# Patient Record
Sex: FEMALE | Race: BLACK OR AFRICAN AMERICAN | NOT HISPANIC OR LATINO | Employment: OTHER | URBAN - METROPOLITAN AREA
[De-identification: names, ages, dates, MRNs, and addresses within clinical notes are randomized per-mention and may not be internally consistent; named-entity substitution may affect disease eponyms.]

---

## 2022-07-01 ENCOUNTER — HOSPITAL ENCOUNTER (OUTPATIENT)
Facility: HOSPITAL | Age: 72
Discharge: HOME | End: 2022-07-02
Attending: EMERGENCY MEDICINE | Admitting: HOSPITALIST
Payer: MEDICARE

## 2022-07-01 DIAGNOSIS — K30 INDIGESTION: ICD-10-CM

## 2022-07-01 DIAGNOSIS — I50.30 (HFPEF) HEART FAILURE WITH PRESERVED EJECTION FRACTION: ICD-10-CM

## 2022-07-01 DIAGNOSIS — R79.89 ELEVATED TROPONIN: Primary | ICD-10-CM

## 2022-07-01 DIAGNOSIS — I21.4 NSTEMI (NON-ST ELEVATED MYOCARDIAL INFARCTION): ICD-10-CM

## 2022-07-01 DIAGNOSIS — H57.9 ITCHY EYES: ICD-10-CM

## 2022-07-01 DIAGNOSIS — R42 DIZZINESS: ICD-10-CM

## 2022-07-01 DIAGNOSIS — R42 DIZZY: ICD-10-CM

## 2022-07-01 PROBLEM — I10 PRIMARY HYPERTENSION: Status: ACTIVE | Noted: 2022-07-01

## 2022-07-01 PROBLEM — E66.9 CLASS 2 OBESITY IN ADULT: Status: ACTIVE | Noted: 2022-07-01

## 2022-07-01 LAB
ALBUMIN SERPL-MCNC: 3.6 G/DL (ref 3.3–5.5)
ALP SERPL-CCNC: 81 U/L (ref 42–141)
ASCENDING AORTA: 2.92 CM
AV INDEX (PROSTH): 0.7
AV MEAN GRADIENT: 6 MMHG
AV PEAK GRADIENT: 11 MMHG
AV VALVE AREA: 2.21 CM2
AV VELOCITY RATIO: 0.65
BILIRUB SERPL-MCNC: 0.7 MG/DL (ref 0.2–1.6)
BILIRUBIN, POC UA: NEGATIVE
BLOOD, POC UA: ABNORMAL
BSA FOR ECHO PROCEDURE: 2.3 M2
BUN SERPL-MCNC: 14 MG/DL (ref 7–22)
CALCIUM SERPL-MCNC: 9.4 MG/DL (ref 8–10.3)
CHLORIDE SERPL-SCNC: 107 MMOL/L (ref 98–108)
CHOLEST SERPL-MCNC: 212 MG/DL (ref 120–199)
CHOLEST/HDLC SERPL: 4.4 {RATIO} (ref 2–5)
CLARITY, POC UA: CLEAR
COLOR, POC UA: YELLOW
CREAT SERPL-MCNC: 0.9 MG/DL (ref 0.6–1.2)
CTP QC/QA: YES
CV ECHO LV RWT: 0.43 CM
DOP CALC AO PEAK VEL: 1.66 M/S
DOP CALC AO VTI: 37.71 CM
DOP CALC LVOT AREA: 3.2 CM2
DOP CALC LVOT DIAMETER: 2.01 CM
DOP CALC LVOT PEAK VEL: 1.08 M/S
DOP CALC LVOT STROKE VOLUME: 83.41 CM3
DOP CALCLVOT PEAK VEL VTI: 26.3 CM
E WAVE DECELERATION TIME: 231.41 MSEC
E/A RATIO: 0.85
E/E' RATIO: 10 M/S
ECHO LV POSTERIOR WALL: 0.92 CM (ref 0.6–1.1)
EJECTION FRACTION: 65 %
FRACTIONAL SHORTENING: 38 % (ref 28–44)
GLUCOSE SERPL-MCNC: 132 MG/DL (ref 73–118)
GLUCOSE, POC UA: NEGATIVE
HDLC SERPL-MCNC: 48 MG/DL (ref 40–75)
HDLC SERPL: 22.6 % (ref 20–50)
INTERVENTRICULAR SEPTUM: 0.92 CM (ref 0.6–1.1)
IVRT: 110.73 MSEC
KETONES, POC UA: NEGATIVE
LA MAJOR: 5.37 CM
LA MINOR: 5.43 CM
LA WIDTH: 4.76 CM
LDLC SERPL CALC-MCNC: 149 MG/DL (ref 63–159)
LEFT ATRIUM SIZE: 3.79 CM
LEFT ATRIUM VOLUME INDEX: 37.5 ML/M2
LEFT ATRIUM VOLUME: 82.8 CM3
LEFT INTERNAL DIMENSION IN SYSTOLE: 2.65 CM (ref 2.1–4)
LEFT VENTRICLE DIASTOLIC VOLUME INDEX: 37.32 ML/M2
LEFT VENTRICLE DIASTOLIC VOLUME: 82.48 ML
LEFT VENTRICLE MASS INDEX: 57 G/M2
LEFT VENTRICLE SYSTOLIC VOLUME INDEX: 11.7 ML/M2
LEFT VENTRICLE SYSTOLIC VOLUME: 25.91 ML
LEFT VENTRICULAR INTERNAL DIMENSION IN DIASTOLE: 4.29 CM (ref 3.5–6)
LEFT VENTRICULAR MASS: 126.56 G
LEUKOCYTE EST, POC UA: ABNORMAL
LV LATERAL E/E' RATIO: 8.89 M/S
LV SEPTAL E/E' RATIO: 11.43 M/S
MV PEAK A VEL: 0.94 M/S
MV PEAK E VEL: 0.8 M/S
MV STENOSIS PRESSURE HALF TIME: 67.11 MS
MV VALVE AREA P 1/2 METHOD: 3.28 CM2
NITRITE, POC UA: NEGATIVE
NONHDLC SERPL-MCNC: 164 MG/DL
PH UR STRIP: 5.5 [PH]
PISA TR MAX VEL: 2.96 M/S
POC ALT (SGPT): 19 U/L (ref 10–47)
POC AST (SGOT): 24 U/L (ref 11–38)
POC B-TYPE NATRIURETIC PEPTIDE: 31.4 PG/ML (ref 0–100)
POC CARDIAC TROPONIN I: 0.01 NG/ML
POC CARDIAC TROPONIN I: 0.1 NG/ML
POC TCO2: 28 MMOL/L (ref 18–33)
POTASSIUM BLD-SCNC: 4.2 MMOL/L (ref 3.6–5.1)
PROTEIN, POC UA: NEGATIVE
PROTEIN, POC: 7.9 G/DL (ref 6.4–8.1)
PULM VEIN S/D RATIO: 2.03
PV PEAK D VEL: 0.36 M/S
PV PEAK S VEL: 0.73 M/S
PV PEAK VELOCITY: 1.24 CM/S
RA MAJOR: 5.32 CM
RA PRESSURE: 3 MMHG
RA WIDTH: 3.92 CM
RIGHT VENTRICULAR END-DIASTOLIC DIMENSION: 3.36 CM
RV TISSUE DOPPLER FREE WALL SYSTOLIC VELOCITY 1 (APICAL 4 CHAMBER VIEW): 13.51 CM/S
SAMPLE: ABNORMAL
SAMPLE: NORMAL
SARS-COV-2 RDRP RESP QL NAA+PROBE: NEGATIVE
SINUS: 2.72 CM
SODIUM BLD-SCNC: 146 MMOL/L (ref 128–145)
SPECIFIC GRAVITY, POC UA: 1.01
STJ: 2.17 CM
TDI LATERAL: 0.09 M/S
TDI SEPTAL: 0.07 M/S
TDI: 0.08 M/S
TR MAX PG: 35 MMHG
TRICUSPID ANNULAR PLANE SYSTOLIC EXCURSION: 2.69 CM
TRIGL SERPL-MCNC: 75 MG/DL (ref 30–150)
TROPONIN I SERPL DL<=0.01 NG/ML-MCNC: 0.01 NG/ML (ref 0–0.03)
TROPONIN I SERPL DL<=0.01 NG/ML-MCNC: 0.01 NG/ML (ref 0–0.03)
TV REST PULMONARY ARTERY PRESSURE: 38 MMHG
UROBILINOGEN, POC UA: 0.2 E.U./DL

## 2022-07-01 PROCEDURE — 84484 ASSAY OF TROPONIN QUANT: CPT | Mod: 91,ER

## 2022-07-01 PROCEDURE — 25000003 PHARM REV CODE 250: Performed by: HOSPITALIST

## 2022-07-01 PROCEDURE — 84484 ASSAY OF TROPONIN QUANT: CPT | Mod: ER

## 2022-07-01 PROCEDURE — 99223 1ST HOSP IP/OBS HIGH 75: CPT | Mod: 25,,, | Performed by: INTERNAL MEDICINE

## 2022-07-01 PROCEDURE — 85025 COMPLETE CBC W/AUTO DIFF WBC: CPT | Mod: ER

## 2022-07-01 PROCEDURE — U0002 COVID-19 LAB TEST NON-CDC: HCPCS | Mod: ER | Performed by: NURSE PRACTITIONER

## 2022-07-01 PROCEDURE — 84484 ASSAY OF TROPONIN QUANT: CPT | Mod: 91 | Performed by: HOSPITALIST

## 2022-07-01 PROCEDURE — 80061 LIPID PANEL: CPT | Performed by: HOSPITALIST

## 2022-07-01 PROCEDURE — 96375 TX/PRO/DX INJ NEW DRUG ADDON: CPT | Mod: ER

## 2022-07-01 PROCEDURE — 93010 EKG 12-LEAD: ICD-10-PCS | Mod: ,,, | Performed by: INTERNAL MEDICINE

## 2022-07-01 PROCEDURE — 96372 THER/PROPH/DIAG INJ SC/IM: CPT | Performed by: EMERGENCY MEDICINE

## 2022-07-01 PROCEDURE — 93010 ELECTROCARDIOGRAM REPORT: CPT | Mod: ,,, | Performed by: INTERNAL MEDICINE

## 2022-07-01 PROCEDURE — G0378 HOSPITAL OBSERVATION PER HR: HCPCS

## 2022-07-01 PROCEDURE — 25000003 PHARM REV CODE 250: Mod: ER | Performed by: EMERGENCY MEDICINE

## 2022-07-01 PROCEDURE — 11000001 HC ACUTE MED/SURG PRIVATE ROOM

## 2022-07-01 PROCEDURE — 63600175 PHARM REV CODE 636 W HCPCS: Mod: ER | Performed by: EMERGENCY MEDICINE

## 2022-07-01 PROCEDURE — 81003 URINALYSIS AUTO W/O SCOPE: CPT | Mod: ER

## 2022-07-01 PROCEDURE — 80053 COMPREHEN METABOLIC PANEL: CPT | Mod: ER

## 2022-07-01 PROCEDURE — 99285 EMERGENCY DEPT VISIT HI MDM: CPT | Mod: 25,ER

## 2022-07-01 PROCEDURE — 96374 THER/PROPH/DIAG INJ IV PUSH: CPT | Mod: ER

## 2022-07-01 PROCEDURE — 36415 COLL VENOUS BLD VENIPUNCTURE: CPT | Performed by: HOSPITALIST

## 2022-07-01 PROCEDURE — 99223 PR INITIAL HOSPITAL CARE,LEVL III: ICD-10-PCS | Mod: 25,,, | Performed by: INTERNAL MEDICINE

## 2022-07-01 PROCEDURE — 83880 ASSAY OF NATRIURETIC PEPTIDE: CPT | Mod: ER

## 2022-07-01 PROCEDURE — 93005 ELECTROCARDIOGRAM TRACING: CPT | Mod: ER

## 2022-07-01 RX ORDER — CALCIUM CARBONATE 200(500)MG
500 TABLET,CHEWABLE ORAL 3 TIMES DAILY PRN
Status: DISCONTINUED | OUTPATIENT
Start: 2022-07-01 | End: 2022-07-02 | Stop reason: HOSPADM

## 2022-07-01 RX ORDER — LIDOCAINE HYDROCHLORIDE 20 MG/ML
10 SOLUTION OROPHARYNGEAL
Status: COMPLETED | OUTPATIENT
Start: 2022-07-01 | End: 2022-07-01

## 2022-07-01 RX ORDER — KETOROLAC TROMETHAMINE 30 MG/ML
15 INJECTION, SOLUTION INTRAMUSCULAR; INTRAVENOUS
Status: COMPLETED | OUTPATIENT
Start: 2022-07-01 | End: 2022-07-01

## 2022-07-01 RX ORDER — ASPIRIN 81 MG/1
81 TABLET ORAL DAILY
Status: DISCONTINUED | OUTPATIENT
Start: 2022-07-02 | End: 2022-07-02 | Stop reason: HOSPADM

## 2022-07-01 RX ORDER — PROCHLORPERAZINE EDISYLATE 5 MG/ML
10 INJECTION INTRAMUSCULAR; INTRAVENOUS ONCE
Status: COMPLETED | OUTPATIENT
Start: 2022-07-01 | End: 2022-07-01

## 2022-07-01 RX ORDER — PROCHLORPERAZINE MALEATE 5 MG
10 TABLET ORAL
Status: DISCONTINUED | OUTPATIENT
Start: 2022-07-01 | End: 2022-07-01

## 2022-07-01 RX ORDER — PANTOPRAZOLE SODIUM 40 MG/1
40 TABLET, DELAYED RELEASE ORAL DAILY
Status: DISCONTINUED | OUTPATIENT
Start: 2022-07-01 | End: 2022-07-02 | Stop reason: HOSPADM

## 2022-07-01 RX ORDER — ONDANSETRON 2 MG/ML
4 INJECTION INTRAMUSCULAR; INTRAVENOUS EVERY 4 HOURS PRN
Status: DISCONTINUED | OUTPATIENT
Start: 2022-07-01 | End: 2022-07-02 | Stop reason: HOSPADM

## 2022-07-01 RX ORDER — ASPIRIN 325 MG
325 TABLET, DELAYED RELEASE (ENTERIC COATED) ORAL
Status: COMPLETED | OUTPATIENT
Start: 2022-07-01 | End: 2022-07-01

## 2022-07-01 RX ORDER — LOSARTAN POTASSIUM 25 MG/1
50 TABLET ORAL DAILY
Status: DISCONTINUED | OUTPATIENT
Start: 2022-07-01 | End: 2022-07-02

## 2022-07-01 RX ORDER — MAG HYDROX/ALUMINUM HYD/SIMETH 200-200-20
30 SUSPENSION, ORAL (FINAL DOSE FORM) ORAL
Status: COMPLETED | OUTPATIENT
Start: 2022-07-01 | End: 2022-07-01

## 2022-07-01 RX ORDER — PROCHLORPERAZINE EDISYLATE 5 MG/ML
10 INJECTION INTRAMUSCULAR; INTRAVENOUS ONCE
Status: DISCONTINUED | OUTPATIENT
Start: 2022-07-01 | End: 2022-07-01

## 2022-07-01 RX ORDER — ATORVASTATIN CALCIUM 40 MG/1
40 TABLET, FILM COATED ORAL DAILY
Status: DISCONTINUED | OUTPATIENT
Start: 2022-07-01 | End: 2022-07-02 | Stop reason: HOSPADM

## 2022-07-01 RX ORDER — MAG HYDROX/ALUMINUM HYD/SIMETH 200-200-20
30 SUSPENSION, ORAL (FINAL DOSE FORM) ORAL ONCE
Status: COMPLETED | OUTPATIENT
Start: 2022-07-01 | End: 2022-07-01

## 2022-07-01 RX ORDER — DICYCLOMINE HYDROCHLORIDE 10 MG/1
10 CAPSULE ORAL
Status: DISPENSED | OUTPATIENT
Start: 2022-07-01 | End: 2022-07-01

## 2022-07-01 RX ORDER — METOPROLOL TARTRATE 50 MG/1
50 TABLET ORAL 2 TIMES DAILY
Status: DISCONTINUED | OUTPATIENT
Start: 2022-07-01 | End: 2022-07-02 | Stop reason: HOSPADM

## 2022-07-01 RX ORDER — CLONIDINE HYDROCHLORIDE 0.1 MG/1
0.1 TABLET ORAL EVERY 4 HOURS PRN
Status: DISCONTINUED | OUTPATIENT
Start: 2022-07-01 | End: 2022-07-02 | Stop reason: HOSPADM

## 2022-07-01 RX ORDER — IBUPROFEN 600 MG/1
600 TABLET ORAL
Status: DISCONTINUED | OUTPATIENT
Start: 2022-07-01 | End: 2022-07-01

## 2022-07-01 RX ORDER — LIDOCAINE HYDROCHLORIDE 20 MG/ML
10 SOLUTION OROPHARYNGEAL ONCE
Status: COMPLETED | OUTPATIENT
Start: 2022-07-01 | End: 2022-07-01

## 2022-07-01 RX ORDER — ENOXAPARIN SODIUM 100 MG/ML
1 INJECTION SUBCUTANEOUS
Status: COMPLETED | OUTPATIENT
Start: 2022-07-01 | End: 2022-07-01

## 2022-07-01 RX ORDER — CLOPIDOGREL BISULFATE 75 MG/1
75 TABLET ORAL DAILY
Status: DISCONTINUED | OUTPATIENT
Start: 2022-07-02 | End: 2022-07-02 | Stop reason: HOSPADM

## 2022-07-01 RX ORDER — CLOPIDOGREL 300 MG/1
300 TABLET, FILM COATED ORAL ONCE
Status: COMPLETED | OUTPATIENT
Start: 2022-07-01 | End: 2022-07-01

## 2022-07-01 RX ORDER — CLOPIDOGREL 300 MG/1
300 TABLET, FILM COATED ORAL ONCE
Status: DISCONTINUED | OUTPATIENT
Start: 2022-07-01 | End: 2022-07-01

## 2022-07-01 RX ADMIN — LIDOCAINE HYDROCHLORIDE 10 ML: 20 SOLUTION ORAL at 04:07

## 2022-07-01 RX ADMIN — ALUMINUM HYDROXIDE, MAGNESIUM HYDROXIDE, AND SIMETHICONE 30 ML: 200; 200; 20 SUSPENSION ORAL at 07:07

## 2022-07-01 RX ADMIN — ASPIRIN 325 MG: 325 TABLET, COATED ORAL at 10:07

## 2022-07-01 RX ADMIN — ALUMINUM HYDROXIDE, MAGNESIUM HYDROXIDE, AND SIMETHICONE 30 ML: 200; 200; 20 SUSPENSION ORAL at 04:07

## 2022-07-01 RX ADMIN — PROCHLORPERAZINE EDISYLATE 10 MG: 5 INJECTION INTRAMUSCULAR; INTRAVENOUS at 08:07

## 2022-07-01 RX ADMIN — METOPROLOL TARTRATE 50 MG: 50 TABLET, FILM COATED ORAL at 09:07

## 2022-07-01 RX ADMIN — ATORVASTATIN CALCIUM 40 MG: 40 TABLET, FILM COATED ORAL at 02:07

## 2022-07-01 RX ADMIN — CLONIDINE HYDROCHLORIDE 0.1 MG: 0.1 TABLET ORAL at 06:07

## 2022-07-01 RX ADMIN — LIDOCAINE HYDROCHLORIDE 10 ML: 20 SOLUTION ORAL at 07:07

## 2022-07-01 RX ADMIN — PANTOPRAZOLE SODIUM 40 MG: 40 TABLET, DELAYED RELEASE ORAL at 03:07

## 2022-07-01 RX ADMIN — KETOROLAC TROMETHAMINE 15 MG: 30 INJECTION, SOLUTION INTRAMUSCULAR at 08:07

## 2022-07-01 RX ADMIN — CLOPIDOGREL BISULFATE 300 MG: 300 TABLET, FILM COATED ORAL at 10:07

## 2022-07-01 RX ADMIN — ENOXAPARIN SODIUM 110 MG: 60 INJECTION SUBCUTANEOUS at 10:07

## 2022-07-01 RX ADMIN — CALCIUM CARBONATE (ANTACID) CHEW TAB 500 MG 500 MG: 500 CHEW TAB at 03:07

## 2022-07-01 RX ADMIN — LOSARTAN POTASSIUM 50 MG: 25 TABLET, FILM COATED ORAL at 02:07

## 2022-07-01 NOTE — DISCHARGE INSTRUCTIONS
WRITTEN HEALTHCARE DISCHARGE INFORMATION      Things that YOU are responsible for to Manage Your Care At Home:  1. Getting your prescriptions filled.  2. Taking you medications as directed. DO NOT MISS ANY DOSES!  3. Going to your follow-up doctor appointments. This is important because it allows the doctor to monitor your progress and to determine if any changes need to be made to your treatment plan.     If you are unable to make your follow up appointments, please call the number listed and reschedule this appointment.      After discharge, if you need assistance, you can call [facility] On Call [facility] Line for 24/7 assistance at [phone]     If you are experience any signs or symptoms, Call your doctor or Call 911 and come to your nearest Emergency Room.     Thank you for choosing [facility] and allowing us to care for you.        You should receive a call from [facility] Discharge Department within 48-72 hours to help manage your care after discharge. Please try to make sure that you answer your phone for this important phone call.       Follow-up Information       Purnima Johns MD Follow up.    Specialty: Internal Medicine  Why: Please call office on Tuesday to arrange a, HOSPITAL DISCHARGE FOLLOW UP VISIT, in 1-2 weeks  Contact information:  [address]  [facility] [address]  [phone]               Mj Mcelroy MD Follow up on 7/26/2022.    Specialty: Cardiology  Why: appointment scheduled for 0945;, you have been placed on the waiting list, Office may call if sooner appt is available  Contact information:  [address]  [address]  [facility] [address]  [phone]               [facility] - Gastroenterology Follow up.    Specialty: Gastroenterology  Why: Please call next week to ensure appointment has been made for follow up  Contact information:  [address] [facility] [address]  [facility] [address] [phone]  Additional information:  Please park in garage or surface lot  and use Medical Office Bldg entrance. Check in at [address]                         '

## 2022-07-01 NOTE — SUBJECTIVE & OBJECTIVE
Past Medical History:   Diagnosis Date    MS (multiple sclerosis)        No past surgical history on file.    Review of patient's allergies indicates:  No Known Allergies    No current facility-administered medications on file prior to encounter.     Current Outpatient Medications on File Prior to Encounter   Medication Sig    dimethyl fumarate 240 mg CpDR Take 240 mg by mouth 2 (two) times daily.     Family History    None       Tobacco Use    Smoking status: Not on file    Smokeless tobacco: Not on file   Substance and Sexual Activity    Alcohol use: Not on file    Drug use: Not on file    Sexual activity: Not on file     Review of Systems   Constitutional:  Positive for activity change and appetite change.   HENT:  Negative for congestion.    Eyes:  Negative for discharge and itching.   Respiratory:  Negative for apnea and chest tightness.    Cardiovascular:  Negative for chest pain and leg swelling.   Gastrointestinal:  Negative for abdominal distention and abdominal pain.   Endocrine: Negative for cold intolerance and heat intolerance.   Genitourinary:  Negative for difficulty urinating and dyspareunia.   Musculoskeletal:  Negative for arthralgias and back pain.   Skin:  Negative for color change and pallor.   Allergic/Immunologic: Negative for environmental allergies.   Neurological:  Positive for dizziness and light-headedness.   Hematological:  Negative for adenopathy.   Psychiatric/Behavioral:  Negative for agitation and behavioral problems.    Objective:     Vital Signs (Most Recent):  Temp: 98.1 °F (36.7 °C) (07/01/22 1328)  Pulse: 83 (07/01/22 1328)  Resp: 16 (07/01/22 1328)  BP: (!) 182/87 (07/01/22 1328)  SpO2: 99 % (07/01/22 1328)   Vital Signs (24h Range):  Temp:  [98.1 °F (36.7 °C)-98.2 °F (36.8 °C)] 98.1 °F (36.7 °C)  Pulse:  [64-89] 83  Resp:  [12-20] 16  SpO2:  [96 %-99 %] 99 %  BP: (131-192)/(59-97) 182/87     Weight: 112 kg (247 lb)  Body mass index is 38.69 kg/m².    Physical  Exam  Constitutional:       Appearance: Normal appearance.   HENT:      Head: Normocephalic and atraumatic.      Nose: Nose normal.      Mouth/Throat:      Mouth: Mucous membranes are dry.   Eyes:      Extraocular Movements: Extraocular movements intact.      Pupils: Pupils are equal, round, and reactive to light.   Cardiovascular:      Rate and Rhythm: Normal rate and regular rhythm.      Heart sounds: No murmur heard.    No gallop.   Pulmonary:      Effort: No respiratory distress.      Breath sounds: No wheezing.   Abdominal:      General: There is no distension.      Tenderness: There is no abdominal tenderness.   Musculoskeletal:         General: No swelling.      Cervical back: Normal range of motion and neck supple.   Neurological:      Mental Status: She is alert and oriented to person, place, and time.      Cranial Nerves: No cranial nerve deficit.      Motor: No weakness.   Psychiatric:         Mood and Affect: Mood normal.         Behavior: Behavior normal.         CRANIAL NERVES     CN III, IV, VI   Pupils are equal, round, and reactive to light.     Significant Labs: All pertinent labs within the past 24 hours have been reviewed.  BMP: No results for input(s): GLU, NA, K, CL, CO2, BUN, CREATININE, CALCIUM, MG in the last 48 hours.  CBC: No results for input(s): WBC, HGB, HCT, PLT in the last 48 hours.  CMP: No results for input(s): NA, K, CL, CO2, GLU, BUN, CREATININE, CALCIUM, PROT, ALBUMIN, BILITOT, ALKPHOS, AST, ALT, ANIONGAP, EGFRNONAA in the last 48 hours.    Invalid input(s): ESTGFAFRICA  Troponin: No results for input(s): TROPONINI in the last 48 hours.    Significant Imaging: I have reviewed all pertinent imaging results/findings within the past 24 hours.

## 2022-07-01 NOTE — ASSESSMENT & PLAN NOTE
she has slight elevated troponin 0.1,but no sign of STEMI on EKG,patient is transferred from [facility] [place] for further work in,currently patient denies chest pain.will check echo,cardioac monitor,cycle cardiac marks.

## 2022-07-01 NOTE — ASSESSMENT & PLAN NOTE
Initial troponin 0.1. CP atypical. EKG without acute changes. Echo with EF 65% and normal wall motion. If repeat troponin stable then ok for out patient stress test

## 2022-07-01 NOTE — NURSING
Pt arrived to medsur unit via stretcher alert and oriented x4. Vital signs stable blood pressure elevated  at bedside aware of current  blood pressure. Patient oriented to room call bell within reach safety measures in place assessment to follow.

## 2022-07-01 NOTE — CONSULTS
"[facility]urg  Cardiology  Consult Note    Patient Name: Sharla Leong  MRN: [id number]  Admission Date: 7/1/2022  Hospital Length of Stay: 0 days  Code Status: No Order   Attending Provider: Alexia Ceballos MD   Consulting Provider: Mj Mcelroy MD  Primary Care Physician: Purnima Johns MD  Principal Problem:Elevated troponin    Patient information was obtained from patient and ER records.     Consults  Subjective:     Chief Complaint:  Elevated troponin     HPI: 72 y.o. female with no known medical problems,does not see PCP, presents to ED c/o acute lightheadedness that she noticed this morning just prior to arrival when she got out of bed and began walking to the bathroom. She denies syncope, difficulty walking, focal weakness, NV, CP or SOB.  She mentions acute on chronic "indigestion" which she describes as episodes of frequent belching.  She mentions a stated history of GERD for which, she only takes Gas X as needed.patient was feeling lightheadedness in ER,she has slight elevated troponin 0.1,but no sign of STEMI on EKG,patient is transferred from ER [facility] for further work in,currently patient denies chest pain.of note patient has elevated blood pressure with systolic over 190 and she is not on any medication at home.      Currently denies CP or SOB  Troponin 0.1 repeat pending  EKG NSR - ok    Echo 7/1/22  · The left ventricle is normal in size with normal systolic function.  · The estimated ejection fraction is 65%.  · Grade II left ventricular diastolic dysfunction.  · Normal right ventricular size with normal right ventricular systolic function.  · Normal central venous pressure (3 mmHg).  · The estimated PA systolic pressure is 38 mmHg.         Past Medical History:   Diagnosis Date    MS (multiple sclerosis)        No past surgical history on file.    Review of patient's allergies indicates:  No Known Allergies    No current facility-administered medications on file prior to " encounter.     Current Outpatient Medications on File Prior to Encounter   Medication Sig    dimethyl fumarate 240 mg CpDR Take 240 mg by mouth 2 (two) times daily.     Family History    None       Tobacco Use    Smoking status: Not on file    Smokeless tobacco: Not on file   Substance and Sexual Activity    Alcohol use: Not on file    Drug use: Not on file    Sexual activity: Not on file     Review of Systems   Constitutional: Negative for decreased appetite.   HENT:  Negative for ear discharge.    Eyes:  Negative for blurred vision.   Respiratory:  Negative for hemoptysis.    Endocrine: Negative for polyphagia.   Hematologic/Lymphatic: Negative for adenopathy.   Skin:  Negative for color change.   Musculoskeletal:  Negative for joint swelling.   Genitourinary:  Negative for bladder incontinence.   Neurological:  Negative for brief paralysis.   Psychiatric/Behavioral:  Negative for hallucinations.    Allergic/Immunologic: Negative for hives.   Objective:     Vital Signs (Most Recent):  Temp: 98.1 °F (36.7 °C) (07/01/22 1328)  Pulse: 83 (07/01/22 1328)  Resp: 16 (07/01/22 1328)  BP: (!) 182/87 (07/01/22 1328)  SpO2: 99 % (07/01/22 1328)   Vital Signs (24h Range):  Temp:  [98.1 °F (36.7 °C)-98.2 °F (36.8 °C)] 98.1 °F (36.7 °C)  Pulse:  [64-89] 83  Resp:  [12-20] 16  SpO2:  [96 %-99 %] 99 %  BP: (131-192)/(59-97) 182/87     Weight: 112 kg (247 lb)  Body mass index is 38.69 kg/m².    SpO2: 99 %  O2 Device (Oxygen Therapy): room air    No intake or output data in the 24 hours ending 07/01/22 1520    Lines/Drains/Airways       Peripheral Intravenous Line  Duration                  Peripheral IV - Single Lumen 07/01/22 0625 20 G Left Hand <1 day                    Physical Exam  Constitutional:       Appearance: She is well-developed.   HENT:      Head: Normocephalic and atraumatic.   Eyes:      Conjunctiva/sclera: Conjunctivae normal.      Pupils: Pupils are equal, round, and reactive to light.   Cardiovascular:       Rate and Rhythm: Normal rate.      Pulses: Intact distal pulses.      Heart sounds: Normal heart sounds.   Pulmonary:      Effort: Pulmonary effort is normal.      Breath sounds: Normal breath sounds.   Abdominal:      General: Bowel sounds are normal.      Palpations: Abdomen is soft.   Musculoskeletal:         General: Normal range of motion.      Cervical back: Normal range of motion and neck supple.   Skin:     General: Skin is warm and dry.   Neurological:      Mental Status: She is alert and oriented to person, place, and time.       Significant Labs: All pertinent lab results from the last 24 hours have been reviewed.    Significant Imaging: Echocardiogram: 2D echo with color flow doppler: No results found for this or any previous visit.    Assessment and Plan:     * Elevated troponin  Initial troponin 0.1. CP atypical. EKG without acute changes. Echo with EF 65% and normal wall motion. If repeat troponin stable then ok for out patient stress test    Class 2 obesity in adult  counseled    Primary hypertension  Adjust medications        VTE Risk Mitigation (From admission, onward)    None          Thank you for your consult. I will follow-up with patient. Please contact us if you have any additional questions.    Mj Mcelroy MD  Cardiology   [facility] [place] [facility] S[facility]

## 2022-07-01 NOTE — Clinical Note
maggie Vanegas accompanied their grandmother to the emergency department on 7/1/2022. They may return to work on 07/02/2022.      If you have any questions or concerns, please don't hesitate to call.      DALILA Cordova RN

## 2022-07-01 NOTE — SUBJECTIVE & OBJECTIVE
Past Medical History:   Diagnosis Date    MS (multiple sclerosis)        No past surgical history on file.    Review of patient's allergies indicates:  No Known Allergies    No current facility-administered medications on file prior to encounter.     Current Outpatient Medications on File Prior to Encounter   Medication Sig    dimethyl fumarate 240 mg CpDR Take 240 mg by mouth 2 (two) times daily.     Family History    None       Tobacco Use    Smoking status: Not on file    Smokeless tobacco: Not on file   Substance and Sexual Activity    Alcohol use: Not on file    Drug use: Not on file    Sexual activity: Not on file     Review of Systems   Constitutional: Negative for decreased appetite.   HENT:  Negative for ear discharge.    Eyes:  Negative for blurred vision.   Respiratory:  Negative for hemoptysis.    Endocrine: Negative for polyphagia.   Hematologic/Lymphatic: Negative for adenopathy.   Skin:  Negative for color change.   Musculoskeletal:  Negative for joint swelling.   Genitourinary:  Negative for bladder incontinence.   Neurological:  Negative for brief paralysis.   Psychiatric/Behavioral:  Negative for hallucinations.    Allergic/Immunologic: Negative for hives.   Objective:     Vital Signs (Most Recent):  Temp: 98.1 °F (36.7 °C) (07/01/22 1328)  Pulse: 83 (07/01/22 1328)  Resp: 16 (07/01/22 1328)  BP: (!) 182/87 (07/01/22 1328)  SpO2: 99 % (07/01/22 1328)   Vital Signs (24h Range):  Temp:  [98.1 °F (36.7 °C)-98.2 °F (36.8 °C)] 98.1 °F (36.7 °C)  Pulse:  [64-89] 83  Resp:  [12-20] 16  SpO2:  [96 %-99 %] 99 %  BP: (131-192)/(59-97) 182/87     Weight: 112 kg (247 lb)  Body mass index is 38.69 kg/m².    SpO2: 99 %  O2 Device (Oxygen Therapy): room air    No intake or output data in the 24 hours ending 07/01/22 1520    Lines/Drains/Airways       Peripheral Intravenous Line  Duration                  Peripheral IV - Single Lumen 07/01/22 0625 20 G Left Hand <1 day                    Physical  Exam  Constitutional:       Appearance: She is well-developed.   HENT:      Head: Normocephalic and atraumatic.   Eyes:      Conjunctiva/sclera: Conjunctivae normal.      Pupils: Pupils are equal, round, and reactive to light.   Cardiovascular:      Rate and Rhythm: Normal rate.      Pulses: Intact distal pulses.      Heart sounds: Normal heart sounds.   Pulmonary:      Effort: Pulmonary effort is normal.      Breath sounds: Normal breath sounds.   Abdominal:      General: Bowel sounds are normal.      Palpations: Abdomen is soft.   Musculoskeletal:         General: Normal range of motion.      Cervical back: Normal range of motion and neck supple.   Skin:     General: Skin is warm and dry.   Neurological:      Mental Status: She is alert and oriented to person, place, and time.       Significant Labs: All pertinent lab results from the last 24 hours have been reviewed.    Significant Imaging: Echocardiogram: 2D echo with color flow doppler: No results found for this or any previous visit.

## 2022-07-01 NOTE — ED NOTES
"Pt hit call light and RN found pt sitting up on side of bed c/o "I feel dizzy, I need to stand up", pt appears restless and RN x2 having to insist pt to stay in bed so she doesn't fall. Pt c/o feeling extremely dizzy, lightheaded and having "indigestion". EKG obtained STAT, MD notified of events. EKG given to Dr Perez immediately. Pt  Lying in bed and after approximately 5 minutes pt states that symptoms of dizziness has resolved, pt continues to belch and c/o indigestion. MD aware. RN continued cardiac monitoring. No new orders at this time.    "

## 2022-07-01 NOTE — ED PROVIDER NOTES
"Encounter Date: 7/1/2022       History     Chief Complaint   Patient presents with    Headache     Complains of frontal headache and "eye pressure" starting this AM. States she feels her BP is elevated. Denies any medical hx.     72 y.o. female with no known medical problems presents to ED c/o acute lightheadedness that she noticed this morning just prior to arrival when she got out of bed and began walking to the bathroom. She denies syncope, difficulty walking, focal weakness, NV, CP or SOB.  She mentions acute on chronic "indigestion" which she describes as episodes of frequent belching.  She mentions a stated history of GERD for which, she only takes Gas X as needed.   She also mentions acute, bilateral itchy eyes x 2 days but denies vision loss or photophobia.    Denies Tobacco abuse ETOH abuse or illicit drug use.   Currently grieving the loss of her sister that passed away two days ago.  Patient states she has not seen a doctor in quite a while.         Review of patient's allergies indicates:  No Known Allergies  Past Medical History:   Diagnosis Date    MS (multiple sclerosis)      No past surgical history on file.  No family history on file.     Review of Systems   Eyes: Positive for itching. Negative for photophobia, pain, discharge, redness and visual disturbance.   Respiratory: Negative for cough and shortness of breath.    Cardiovascular: Negative for chest pain and palpitations.   Gastrointestinal: Positive for constipation (chronic, occasional). Negative for abdominal pain, blood in stool, diarrhea, nausea and vomiting.   Genitourinary: Negative for dysuria, frequency and hematuria.   Musculoskeletal: Negative for gait problem and neck stiffness.   Neurological: Positive for light-headedness. Negative for headaches.   All other systems reviewed and are negative.      Physical Exam     Initial Vitals [07/01/22 0608]   BP Pulse Resp Temp SpO2   (!) 192/97 89 18 98.2 °F (36.8 °C) 96 %      MAP     "   --         Physical Exam    Nursing note and vitals reviewed.  Constitutional: She appears well-developed and well-nourished. She is not diaphoretic. No distress.   HENT:   Head: Normocephalic and atraumatic.   Eyes: Conjunctivae are normal. Pupils are equal, round, and reactive to light.   Neck: Neck supple.   Normal range of motion.  Cardiovascular: Normal rate and intact distal pulses.   Pulmonary/Chest: No accessory muscle usage. No tachypnea. No respiratory distress.   Abdominal: She exhibits no distension. There is no abdominal tenderness.   Musculoskeletal:         General: No tenderness. Normal range of motion.      Cervical back: Normal range of motion and neck supple.     Neurological: She is alert and oriented to person, place, and time. She has normal strength. No cranial nerve deficit or sensory deficit. Coordination and gait normal. GCS score is 15. GCS eye subscore is 4. GCS verbal subscore is 5. GCS motor subscore is 6.   Skin: Skin is warm. Capillary refill takes less than 2 seconds.   Psychiatric: She has a normal mood and affect.       Right Eye  Right Visual Status: Uncorrected  (states she normally wears glasses, does not have them with her)  Right Visual Test: 20/70  Left Eye  Left Visual Status: Uncorrected  Left Visual Test: 20/100  Both Eyes  Both Visual Status: Uncorrected  Both Visual Test : 20/70  ED Course   Critical Care    Date/Time: 7/1/2022 5:34 PM  Performed by: Alex Perez MD  Authorized by: Alexia Ceballos MD   Direct patient critical care time: 30 minutes  Total critical care time (exclusive of procedural time) : 30 minutes  Critical care was necessary to treat or prevent imminent or life-threatening deterioration of the following conditions: cardiac failure.        Labs Reviewed   TROPONIN ISTAT - Abnormal; Notable for the following components:       Result Value    POC Cardiac Troponin I 0.10 (*)     All other components within normal limits   POCT URINALYSIS  W/O SCOPE - Abnormal; Notable for the following components:    Blood, UA Trace-intact (*)     Leukocytes, UA Trace (*)     All other components within normal limits   POCT CMP - Abnormal; Notable for the following components:    POC Glucose 132 (*)     POC Sodium 146 (*)     All other components within normal limits   TROPONIN ISTAT   POCT CBC   POCT URINALYSIS W/O SCOPE   SARS-COV-2 RDRP GENE    Narrative:     This test utilizes isothermal nucleic acid amplification   technology to detect the SARS-CoV-2 RdRp nucleic acid segment.   The analytical sensitivity (limit of detection) is 125 genome   equivalents/mL.   A POSITIVE result implies infection with the SARS-CoV-2 virus;   the patient is presumed to be contagious.     A NEGATIVE result means that SARS-CoV-2 nucleic acids are not   present above the limit of detection. A NEGATIVE result should be   treated as presumptive. It does not rule out the possibility of   COVID-19 and should not be the sole basis for treatment decisions.   If COVID-19 is strongly suspected based on clinical and exposure   history, re-testing using an alternate molecular assay should be   considered.   This test is only for use under the Food and Drug   Administration s Emergency Use Authorization (EUA).   Commercial kits are provided by [employer].   Performance characteristics of the EUA have been independently   verified by [facility] Department of   Pathology and Laboratory Medicine.   _________________________________________________________________   The authorized Fact Sheet for Healthcare Providers and the authorized Fact   Sheet for Patients of the ID NOW COVID-19 are available on the FDA   website:     https://www.fda.gov/media/742424/download  https://www.fda.gov/media/415299/download          POCT CMP   POCT B-TYPE NATRIURETIC PEPTIDE (BNP)   POCT TROPONIN   POCT B-TYPE NATRIURETIC PEPTIDE (BNP)   POCT TROPONIN        ECG Results          EKG 12-lead (Final  result)  Result time 07/01/22 16:40:30    Final result by Interface, Lab In [facility] (07/01/22 16:40:30)                 Narrative:    Test Reason : R42,    Vent. Rate : 078 BPM     Atrial Rate : 078 BPM     P-R Int : 158 ms          QRS Dur : 072 ms      QT Int : 392 ms       P-R-T Axes : 049 -04 003 degrees     QTc Int : 446 ms    Normal sinus rhythm  Septal infarct ,age undetermined  Abnormal ECG  No previous ECGs available  Confirmed by Mj Mcelroy MD ([id number]) on 7/1/2022 4:40:19 PM    Referred By: [email or web address]   SELF           Confirmed By:Mj Mcelroy MD                            Imaging Results          CT Head Without Contrast (Final result)  Result time 07/01/22 09:19:26    Final result by Jean Tatum MD (07/01/22 09:19:26)                 Impression:      No definite acute intracranial findings by noncontrast CT.      Electronically signed by: Jean Tatum  Date:    07/01/2022  Time:    09:19             Narrative:    EXAMINATION:  CT HEAD WITHOUT CONTRAST    CLINICAL HISTORY:  Dizziness, persistent/recurrent, cardiac or vascular cause suspected;    TECHNIQUE:  Low dose axial images were obtained through the head.  Coronal and sagittal reformations were also performed. Contrast was not administered.    COMPARISON:  None.    FINDINGS:  Blood: No acute intracranial hemorrhage.    Parenchyma: No definite loss of gray-white differentiation to suggest acute or subacute transcortical infarct. Generalized pattern of age-related parenchymal volume loss.  Nonspecific areas of white matter hypoattenuation could relate to sequela of chronic small vessel ischemic disease.    Ventricles/Extra-axial spaces: No abnormal extra-axial fluid collection. Basal cisterns are patent.    Vessels: Vascular calcifications    Orbits: Unremarkable.    Scalp: Unremarkable.    Skull: There are no depressed skull fractures or destructive bone lesions.    Sinuses and mastoids: Scattered relatively modest paranasal sinus mucosal  thickening.    Other findings: None                               X-Ray Chest AP Portable (Final result)  Result time 07/01/22 07:16:15    Final result by Jaylon Grover MD (07/01/22 07:16:15)                 Impression:      No acute abnormality.      Electronically signed by: Jaylon Grover MD  Date:    07/01/2022  Time:    07:16             Narrative:    EXAMINATION:  XR CHEST AP PORTABLE    CLINICAL HISTORY:  Functional dyspepsia    TECHNIQUE:  Single frontal view of the chest was performed.    COMPARISON:  None    FINDINGS:  The lungs are clear with normal appearance of pulmonary vasculature. No pleural effusion. No evident pneumothorax.    The cardiac silhouette is normal in size. The hilar and mediastinal contours are unremarkable.Atherosclerotic change within the aorta.    Bones are intact.                                 Medications   dicyclomine capsule 10 mg (10 mg Oral Not Given 7/1/22 0659)   aspirin EC tablet 81 mg (has no administration in time range)   clopidogreL tablet 75 mg (has no administration in time range)   losartan tablet 50 mg (50 mg Oral Given 7/1/22 1430)   metoprolol tartrate (LOPRESSOR) tablet 50 mg (has no administration in time range)   atorvastatin tablet 40 mg (40 mg Oral Given 7/1/22 1430)   cloNIDine tablet 0.1 mg (has no administration in time range)   pantoprazole EC tablet 40 mg (40 mg Oral Given 7/1/22 1541)   calcium carbonate 200 mg calcium (500 mg) chewable tablet 500 mg (500 mg Oral Given 7/1/22 1541)   ondansetron injection 4 mg (has no administration in time range)   aluminum-magnesium hydroxide-simethicone 200-200-20 mg/5 mL suspension 30 mL (30 mLs Oral Given 7/1/22 0700)   LIDOcaine HCl 2% oral solution 10 mL (10 mLs Oral Given 7/1/22 0700)   ketorolac injection 15 mg (15 mg Intravenous Given 7/1/22 0809)   prochlorperazine injection Soln 10 mg (10 mg Intravenous Given 7/1/22 0810)   aspirin EC tablet 325 mg (325 mg Oral Given 7/1/22 1037)   enoxaparin injection  110 mg (110 mg Subcutaneous Given 7/1/22 1037)   clopidogreL tablet 300 mg (300 mg Oral Given 7/1/22 1040)   aluminum-magnesium hydroxide-simethicone 200-200-20 mg/5 mL suspension 30 mL (30 mLs Oral Given 7/1/22 1633)     And   LIDOcaine HCl 2% oral solution 10 mL (10 mLs Oral Given 7/1/22 1630)     Medical Decision Making:   Initial Assessment:   Alex Perez  0700 - Patient signed out to me by Dr. Reno.  72-year-old female presenting with dizziness, eye pressure.  Also notes indigestion earlier this morning.  While in the emergency department, patient was noted have a significant dizzy spell.  EKG obtained which was normal.  Repeat troponin obtained which showed a significant elevation in troponin, from 0.01-0.1.  Concern for unstable angina.  Possible NSTEMI.  Patient given aspirin, Plavix, Lovenox.  Will transfer patient to [facility] for further evaluation and treatment of NSTEMI.  Clinical Tests:   Lab Tests: Ordered and Reviewed  Radiological Study: Ordered and Reviewed  Medical Tests: Ordered and Reviewed                    Labs Reviewed        Admission on 07/01/2022   Component Date Value Ref Range Status    POC Cardiac Troponin I 07/01/2022 0.01  <0.09 ng/mL Final    Sample 07/01/2022 unknown   Final    Comment: A single negative troponin is insufficient to rule out myocardial infarction.  The use of a serial sampling protocol is recommended practice. Correlate results with reference intervals established for methodology used. Point of care and core laboratory   troponin results are not interchangeable.      Albumin, POC 07/01/2022 3.6  3.3 - 5.5 g/dL Final    Alkaline Phosphatase, POC 07/01/2022 81  42 - 141 U/L Final    ALT (SGPT), POC 07/01/2022 19  10 - 47 U/L Final    AST (SGOT), POC 07/01/2022 24  11 - 38 U/L Final    POC BUN 07/01/2022 14  7 - 22 mg/dL Final    Calcium, POC 07/01/2022 9.4  8.0 - 10.3 mg/dL Final    POC Chloride 07/01/2022 107  98 - 108 mmol/L Final    POC  Creatinine 07/01/2022 0.9  0.6 - 1.2 mg/dL Final    POC Glucose 07/01/2022 132 (A) 73 - 118 mg/dL Final    POC Potassium 07/01/2022 4.2  3.6 - 5.1 mmol/L Final    POC Sodium 07/01/2022 146 (A) 128 - 145 mmol/L Final    Bilirubin, POC 07/01/2022 0.7  0.2 - 1.6 mg/dL Final    POC TCO2 07/01/2022 28  18 - 33 mmol/L Final    Protein, POC 07/01/2022 7.9  6.4 - 8.1 g/dL Final    POC B-Type Natriuretic Peptide 07/01/2022 31.4  0.0 - 100.0 pg/mL Final    Glucose, UA 07/01/2022 Negative   Final    Bilirubin, UA 07/01/2022 Negative   Final    Ketones, UA 07/01/2022 Negative   Final    Spec Grav UA 07/01/2022 1.015   Final    Blood, UA 07/01/2022 Trace-intact (A)  Final    PH, UA 07/01/2022 5.5   Final    Protein, UA 07/01/2022 Negative   Final    Urobilinogen, UA 07/01/2022 0.2  E.U./dL Final    Nitrite, UA 07/01/2022 Negative   Final    Leukocytes, UA 07/01/2022 Trace (A)  Final    Color, UA 07/01/2022 Yellow   Final    Clarity, UA 07/01/2022 Clear   Final    POC Cardiac Troponin I 07/01/2022 0.10 (A) <0.09 ng/mL Final    Sample 07/01/2022 unknown   Final    Comment: A single negative troponin is insufficient to rule out myocardial infarction.  The use of a serial sampling protocol is recommended practice. Correlate results with reference intervals established for methodology used. Point of care and core laboratory   troponin results are not interchangeable.      POC Rapid COVID 07/01/2022 Negative  Negative Final     Acceptable 07/01/2022 Yes   Final    Troponin I 07/01/2022 0.014  0.000 - 0.026 ng/mL Final    Comment: The reference interval for Troponin I represents the 99th percentile   cutoff   for our facility and is consistent with 3rd generation assay   performance.      Cholesterol 07/01/2022 212 (A) 120 - 199 mg/dL Final    Comment: The National Cholesterol Education Program (NCEP) has set the  following guidelines (reference ranges) for  Cholesterol:  Optimal.....................<200 mg/dL  Borderline High.............200-239 mg/dL  High........................> or = 240 mg/dL  Specimen moderately hemolyzed      Triglycerides 07/01/2022 75  30 - 150 mg/dL Final    Comment: The National Cholesterol Education Program (NCEP) has set the  following guidelines (reference values) for triglycerides:  Normal......................<150 mg/dL  Borderline High.............150-199 mg/dL  High........................200-499 mg/dL      HDL 07/01/2022 48  40 - 75 mg/dL Final    Comment: The National Cholesterol Education Program (NCEP) has set the  following guidelines (reference values) for HDL Cholesterol:  Low...............<40 mg/dL  Optimal...........>60 mg/dL      LDL Cholesterol 07/01/2022 149.0  63.0 - 159.0 mg/dL Final    Comment: The National Cholesterol Education Program (NCEP) has set the  following guidelines (reference values) for LDL Cholesterol:  Optimal.......................<130 mg/dL  Borderline High...............130-159 mg/dL  High..........................160-189 mg/dL  Very High.....................>190 mg/dL      HDL/Cholesterol Ratio 07/01/2022 22.6  20.0 - 50.0 % Final    Total Cholesterol/HDL Ratio 07/01/2022 4.4  2.0 - 5.0 Final    Non-HDL Cholesterol 07/01/2022 164  mg/dL Final    Comment: Risk category and Non-HDL cholesterol goals:  Coronary heart disease (CHD)or equivalent (10-year risk of CHD >20%):  Non-HDL cholesterol goal     <130 mg/dL  Two or more CHD risk factors and 10-year risk of CHD <= 20%:  Non-HDL cholesterol goal     <160 mg/dL  0 to 1 CHD risk factor:  Non-HDL cholesterol goal     <190 mg/dL      BSA 07/01/2022 2.3  m2 Final    TDI SEPTAL 07/01/2022 0.07  m/s Final    LV LATERAL E/E' RATIO 07/01/2022 8.89  m/s Final    LV SEPTAL E/E' RATIO 07/01/2022 11.43  m/s Final    LA WIDTH 07/01/2022 4.76  cm Final    TDI LATERAL 07/01/2022 0.09  m/s Final    PV PEAK VELOCITY 07/01/2022 1.24  cm/s Final    LVIDd  07/01/2022 4.29  3.5 - 6.0 cm Final    IVS 07/01/2022 0.92  0.6 - 1.1 cm Final    Posterior Wall 07/01/2022 0.92  0.6 - 1.1 cm Final    LVIDs 07/01/2022 2.65  2.1 - 4.0 cm Final    FS 07/01/2022 38  28 - 44 % Final    LA volume 07/01/2022 82.80  cm3 Final    Sinus 07/01/2022 2.72  cm Final    STJ 07/01/2022 2.17  cm Final    Ascending aorta 07/01/2022 2.92  cm Final    LV mass 07/01/2022 126.56  g Final    LA size 07/01/2022 3.79  cm Final    RVDD 07/01/2022 3.36  cm Final    TAPSE 07/01/2022 2.69  cm Final    RV S' 07/01/2022 13.51  cm/s Final    Left Ventricle Relative Wall Thick* 07/01/2022 0.43  cm Final    AV mean gradient 07/01/2022 6  mmHg Final    AV valve area 07/01/2022 2.21  cm2 Final    AV Velocity Ratio 07/01/2022 0.65   Final    AV index (prosthetic) 07/01/2022 0.70   Final    MV valve area p 1/2 method 07/01/2022 3.28  cm2 Final    E/A ratio 07/01/2022 0.85   Final    Mean e' 07/01/2022 0.08  m/s Final    E wave deceleration time 07/01/2022 231.41  msec Final    IVRT 07/01/2022 110.73  msec Final    Pulm vein S/D ratio 07/01/2022 2.03   Final    LVOT diameter 07/01/2022 2.01  cm Final    LVOT area 07/01/2022 3.2  cm2 Final    LVOT peak reece 07/01/2022 1.08  m/s Final    LVOT peak VTI 07/01/2022 26.30  cm Final    Ao peak reece 07/01/2022 1.66  m/s Final    Ao VTI 07/01/2022 37.71  cm Final    LVOT stroke volume 07/01/2022 83.41  cm3 Final    AV peak gradient 07/01/2022 11  mmHg Final    E/E' ratio 07/01/2022 10.00  m/s Final    MV Peak E Reece 07/01/2022 0.80  m/s Final    TR Max Reece 07/01/2022 2.96  m/s Final    MV stenosis pressure 1/2 time 07/01/2022 67.11  ms Final    MV Peak A Reece 07/01/2022 0.94  m/s Final    PV Peak S Reece 07/01/2022 0.73  m/s Final    PV Peak D Reece 07/01/2022 0.36  m/s Final    LV Systolic Volume 07/01/2022 25.91  mL Final    LV Systolic Volume Index 07/01/2022 11.7  mL/m2 Final    LV Diastolic Volume 07/01/2022 82.48  mL Final    LV  Diastolic Volume Index 07/01/2022 37.32  mL/m2 Final    LA Volume Index 07/01/2022 37.5  mL/m2 Final    LV Mass Index 07/01/2022 57  g/m2 Final    RA Major Axis 07/01/2022 5.32  cm Final    Left Atrium Minor Axis 07/01/2022 5.43  cm Final    Left Atrium Major Axis 07/01/2022 5.37  cm Final    Triscuspid Valve Regurgitation Pea* 07/01/2022 35  mmHg Final    RA Width 07/01/2022 3.92  cm Final    Right Atrial Pressure (from IVC) 07/01/2022 3  mmHg Final    EF 07/01/2022 65  % Final    TV rest pulmonary artery pressure 07/01/2022 38  mmHg Final        Imaging Reviewed    Imaging Results          CT Head Without Contrast (Final result)  Result time 07/01/22 09:19:26    Final result by Jean Tatum MD (07/01/22 09:19:26)                 Impression:      No definite acute intracranial findings by noncontrast CT.      Electronically signed by: Jean Tatum  Date:    07/01/2022  Time:    09:19             Narrative:    EXAMINATION:  CT HEAD WITHOUT CONTRAST    CLINICAL HISTORY:  Dizziness, persistent/recurrent, cardiac or vascular cause suspected;    TECHNIQUE:  Low dose axial images were obtained through the head.  Coronal and sagittal reformations were also performed. Contrast was not administered.    COMPARISON:  None.    FINDINGS:  Blood: No acute intracranial hemorrhage.    Parenchyma: No definite loss of gray-white differentiation to suggest acute or subacute transcortical infarct. Generalized pattern of age-related parenchymal volume loss.  Nonspecific areas of white matter hypoattenuation could relate to sequela of chronic small vessel ischemic disease.    Ventricles/Extra-axial spaces: No abnormal extra-axial fluid collection. Basal cisterns are patent.    Vessels: Vascular calcifications    Orbits: Unremarkable.    Scalp: Unremarkable.    Skull: There are no depressed skull fractures or destructive bone lesions.    Sinuses and mastoids: Scattered relatively modest paranasal sinus mucosal  thickening.    Other findings: None                               X-Ray Chest AP Portable (Final result)  Result time 07/01/22 07:16:15    Final result by Jaylon Grover MD (07/01/22 07:16:15)                 Impression:      No acute abnormality.      Electronically signed by: Jaylon Grover MD  Date:    07/01/2022  Time:    07:16             Narrative:    EXAMINATION:  XR CHEST AP PORTABLE    CLINICAL HISTORY:  Functional dyspepsia    TECHNIQUE:  Single frontal view of the chest was performed.    COMPARISON:  None    FINDINGS:  The lungs are clear with normal appearance of pulmonary vasculature. No pleural effusion. No evident pneumothorax.    The cardiac silhouette is normal in size. The hilar and mediastinal contours are unremarkable.Atherosclerotic change within the aorta.    Bones are intact.                                Medications given in ED    Medications   dicyclomine capsule 10 mg (10 mg Oral Not Given 7/1/22 0659)   aspirin EC tablet 81 mg (has no administration in time range)   clopidogreL tablet 75 mg (has no administration in time range)   losartan tablet 50 mg (50 mg Oral Given 7/1/22 1430)   metoprolol tartrate (LOPRESSOR) tablet 50 mg (has no administration in time range)   atorvastatin tablet 40 mg (40 mg Oral Given 7/1/22 1430)   cloNIDine tablet 0.1 mg (has no administration in time range)   pantoprazole EC tablet 40 mg (40 mg Oral Given 7/1/22 1541)   calcium carbonate 200 mg calcium (500 mg) chewable tablet 500 mg (500 mg Oral Given 7/1/22 1541)   ondansetron injection 4 mg (has no administration in time range)   aluminum-magnesium hydroxide-simethicone 200-200-20 mg/5 mL suspension 30 mL (30 mLs Oral Given 7/1/22 0700)   LIDOcaine HCl 2% oral solution 10 mL (10 mLs Oral Given 7/1/22 0700)   ketorolac injection 15 mg (15 mg Intravenous Given 7/1/22 0809)   prochlorperazine injection Soln 10 mg (10 mg Intravenous Given 7/1/22 0810)   aspirin EC tablet 325 mg (325 mg Oral Given 7/1/22  1037)   enoxaparin injection 110 mg (110 mg Subcutaneous Given 7/1/22 1037)   clopidogreL tablet 300 mg (300 mg Oral Given 7/1/22 1040)   aluminum-magnesium hydroxide-simethicone 200-200-20 mg/5 mL suspension 30 mL (30 mLs Oral Given 7/1/22 1633)     And   LIDOcaine HCl 2% oral solution 10 mL (10 mLs Oral Given 7/1/22 1630)   orthostatics negative.        Note was created using voice recognition software. Note may have occasional typographical errors that may not have been identified and edited despite good keyanna initial review prior to signing.    Clinical Impression:   Final diagnoses:  [R42] Dizzy  [K30] Indigestion  [R68.89] Itchy eyes (Primary)  [I21.4] NSTEMI (non-ST elevated myocardial infarction)  [R42] Dizziness          ED Disposition Condition    Admit               Alex Perez MD  07/01/22 9388

## 2022-07-01 NOTE — H&P
"[facility] Medicine  History & Physical    Patient Name: Sharla Leong  MRN: [id number]  Patient Class: IP- Inpatient  Admission Date: 7/1/2022  Attending Physician: Alexia Ceballos MD   Primary Care Provider: Purnima Johns MD         Patient information was obtained from patient and ER records.     Subjective:     Principal Problem:Elevated troponin    Chief Complaint:   Chief Complaint   Patient presents with    Headache     Complains of frontal headache and "eye pressure" starting this AM. States she feels her BP is elevated. Denies any medical hx.        HPI: 72 y.o. female with no known medical problems,does not see PCP, presents to ED c/o acute lightheadedness that she noticed this morning just prior to arrival when she got out of bed and began walking to the bathroom. She denies syncope, difficulty walking, focal weakness, NV, CP or SOB.  She mentions acute on chronic "indigestion" which she describes as episodes of frequent belching.  She mentions a stated history of GERD for which, she only takes Gas X as needed.patient was feeling lightheadedness in ER,she has slight elevated troponin 0.1,but no sign of STEMI on EKG,patient is transferred from ER [facility] for further work in,currently patient denies chest pain.of note patient has elevated blood pressure with systolic over 190 and she is not on any medication at home.            Past Medical History:   Diagnosis Date    MS (multiple sclerosis)        No past surgical history on file.    Review of patient's allergies indicates:  No Known Allergies    No current facility-administered medications on file prior to encounter.     Current Outpatient Medications on File Prior to Encounter   Medication Sig    dimethyl fumarate 240 mg CpDR Take 240 mg by mouth 2 (two) times daily.     Family History    None       Tobacco Use    Smoking status: Not on file    Smokeless tobacco: Not on file   Substance and Sexual Activity    Alcohol " use: Not on file    Drug use: Not on file    Sexual activity: Not on file     Review of Systems   Constitutional:  Positive for activity change and appetite change.   HENT:  Negative for congestion.    Eyes:  Negative for discharge and itching.   Respiratory:  Negative for apnea and chest tightness.    Cardiovascular:  Negative for chest pain and leg swelling.   Gastrointestinal:  Negative for abdominal distention and abdominal pain.   Endocrine: Negative for cold intolerance and heat intolerance.   Genitourinary:  Negative for difficulty urinating and dyspareunia.   Musculoskeletal:  Negative for arthralgias and back pain.   Skin:  Negative for color change and pallor.   Allergic/Immunologic: Negative for environmental allergies.   Neurological:  Positive for dizziness and light-headedness.   Hematological:  Negative for adenopathy.   Psychiatric/Behavioral:  Negative for agitation and behavioral problems.    Objective:     Vital Signs (Most Recent):  Temp: 98.1 °F (36.7 °C) (07/01/22 1328)  Pulse: 83 (07/01/22 1328)  Resp: 16 (07/01/22 1328)  BP: (!) 182/87 (07/01/22 1328)  SpO2: 99 % (07/01/22 1328)   Vital Signs (24h Range):  Temp:  [98.1 °F (36.7 °C)-98.2 °F (36.8 °C)] 98.1 °F (36.7 °C)  Pulse:  [64-89] 83  Resp:  [12-20] 16  SpO2:  [96 %-99 %] 99 %  BP: (131-192)/(59-97) 182/87     Weight: 112 kg (247 lb)  Body mass index is 38.69 kg/m².    Physical Exam  Constitutional:       Appearance: Normal appearance.   HENT:      Head: Normocephalic and atraumatic.      Nose: Nose normal.      Mouth/Throat:      Mouth: Mucous membranes are dry.   Eyes:      Extraocular Movements: Extraocular movements intact.      Pupils: Pupils are equal, round, and reactive to light.   Cardiovascular:      Rate and Rhythm: Normal rate and regular rhythm.      Heart sounds: No murmur heard.    No gallop.   Pulmonary:      Effort: No respiratory distress.      Breath sounds: No wheezing.   Abdominal:      General: There is no  distension.      Tenderness: There is no abdominal tenderness.   Musculoskeletal:         General: No swelling.      Cervical back: Normal range of motion and neck supple.   Neurological:      Mental Status: She is alert and oriented to person, place, and time.      Cranial Nerves: No cranial nerve deficit.      Motor: No weakness.   Psychiatric:         Mood and Affect: Mood normal.         Behavior: Behavior normal.         CRANIAL NERVES     CN III, IV, VI   Pupils are equal, round, and reactive to light.     Significant Labs: All pertinent labs within the past 24 hours have been reviewed.  BMP: No results for input(s): GLU, NA, K, CL, CO2, BUN, CREATININE, CALCIUM, MG in the last 48 hours.  CBC: No results for input(s): WBC, HGB, HCT, PLT in the last 48 hours.  CMP: No results for input(s): NA, K, CL, CO2, GLU, BUN, CREATININE, CALCIUM, PROT, ALBUMIN, BILITOT, ALKPHOS, AST, ALT, ANIONGAP, EGFRNONAA in the last 48 hours.    Invalid input(s): ESTGFAFRICA  Troponin: No results for input(s): TROPONINI in the last 48 hours.    Significant Imaging: I have reviewed all pertinent imaging results/findings within the past 24 hours.    Assessment/Plan:     * Elevated troponin  she has slight elevated troponin 0.1,but no sign of STEMI on EKG,patient is transferred from [facility] for further work in,currently patient denies chest pain.will check echo,cardioac monitor,cycle cardiac marks.      Class 2 obesity in adult  Body mass index is 38.69 kg/m². Morbid obesity complicates all aspects of disease management from diagnostic modalities to treatment. Weight loss encouraged and health benefits explained to patient.         Primary hypertension  Started on ARB and BB and prn clonidine.        VTE Risk Mitigation (From admission, onward)    None             Alexia Ceballos MD  Department of Hospital Medicine   [facility]

## 2022-07-01 NOTE — ASSESSMENT & PLAN NOTE
Body mass index is 38.69 kg/m². Morbid obesity complicates all aspects of disease management from diagnostic modalities to treatment. Weight loss encouraged and health benefits explained to patient.

## 2022-07-01 NOTE — ED NOTES
"Rounded on pt, pt resting comfortably in NAD. Pt states that she is feeling " a little better". Denies needs. Pt reports unable to void at this time. Call light in reach.  "

## 2022-07-01 NOTE — ED NOTES
Orthostatics completed. Pt tolerated   Lying: BP: 151/70 NV: 70    Sitting: BP: 152/65 NV: 78    Standing: BP: 162/78 NV: 75  Provider notified of results.

## 2022-07-02 VITALS
WEIGHT: 247 LBS | HEART RATE: 66 BPM | SYSTOLIC BLOOD PRESSURE: 140 MMHG | HEIGHT: 67 IN | RESPIRATION RATE: 18 BRPM | BODY MASS INDEX: 38.77 KG/M2 | OXYGEN SATURATION: 98 % | DIASTOLIC BLOOD PRESSURE: 78 MMHG | TEMPERATURE: 98 F

## 2022-07-02 PROBLEM — K21.9 GERD (GASTROESOPHAGEAL REFLUX DISEASE): Status: ACTIVE | Noted: 2022-07-02

## 2022-07-02 LAB
ANION GAP SERPL CALC-SCNC: 9 MMOL/L (ref 8–16)
BASOPHILS # BLD AUTO: 0.03 K/UL (ref 0–0.2)
BASOPHILS NFR BLD: 0.4 % (ref 0–1.9)
BUN SERPL-MCNC: 11 MG/DL (ref 8–23)
CALCIUM SERPL-MCNC: 9.1 MG/DL (ref 8.7–10.5)
CHLORIDE SERPL-SCNC: 105 MMOL/L (ref 95–110)
CO2 SERPL-SCNC: 27 MMOL/L (ref 23–29)
CREAT SERPL-MCNC: 0.9 MG/DL (ref 0.5–1.4)
DIFFERENTIAL METHOD: NORMAL
EOSINOPHIL # BLD AUTO: 0.1 K/UL (ref 0–0.5)
EOSINOPHIL NFR BLD: 0.7 % (ref 0–8)
ERYTHROCYTE [DISTWIDTH] IN BLOOD BY AUTOMATED COUNT: 13.4 % (ref 11.5–14.5)
EST. GFR  (AFRICAN AMERICAN): >60 ML/MIN/1.73 M^2
EST. GFR  (NON AFRICAN AMERICAN): >60 ML/MIN/1.73 M^2
GLUCOSE SERPL-MCNC: 112 MG/DL (ref 70–110)
HCT VFR BLD AUTO: 37.8 % (ref 37–48.5)
HGB BLD-MCNC: 12.5 G/DL (ref 12–16)
IMM GRANULOCYTES # BLD AUTO: 0.03 K/UL (ref 0–0.04)
IMM GRANULOCYTES NFR BLD AUTO: 0.4 % (ref 0–0.5)
LYMPHOCYTES # BLD AUTO: 2.1 K/UL (ref 1–4.8)
LYMPHOCYTES NFR BLD: 27.9 % (ref 18–48)
MCH RBC QN AUTO: 28.9 PG (ref 27–31)
MCHC RBC AUTO-ENTMCNC: 33.1 G/DL (ref 32–36)
MCV RBC AUTO: 88 FL (ref 82–98)
MONOCYTES # BLD AUTO: 0.7 K/UL (ref 0.3–1)
MONOCYTES NFR BLD: 10.1 % (ref 4–15)
NEUTROPHILS # BLD AUTO: 4.5 K/UL (ref 1.8–7.7)
NEUTROPHILS NFR BLD: 60.5 % (ref 38–73)
NRBC BLD-RTO: 0 /100 WBC
PLATELET # BLD AUTO: 357 K/UL (ref 150–450)
PMV BLD AUTO: 9.9 FL (ref 9.2–12.9)
POTASSIUM SERPL-SCNC: 4.1 MMOL/L (ref 3.5–5.1)
RBC # BLD AUTO: 4.32 M/UL (ref 4–5.4)
SODIUM SERPL-SCNC: 141 MMOL/L (ref 136–145)
TROPONIN I SERPL DL<=0.01 NG/ML-MCNC: 0.02 NG/ML (ref 0–0.03)
WBC # BLD AUTO: 7.36 K/UL (ref 3.9–12.7)

## 2022-07-02 PROCEDURE — 84484 ASSAY OF TROPONIN QUANT: CPT | Performed by: HOSPITALIST

## 2022-07-02 PROCEDURE — 25000003 PHARM REV CODE 250: Performed by: HOSPITALIST

## 2022-07-02 PROCEDURE — 36415 COLL VENOUS BLD VENIPUNCTURE: CPT | Performed by: HOSPITALIST

## 2022-07-02 PROCEDURE — 99204 OFFICE O/P NEW MOD 45 MIN: CPT | Mod: ,,, | Performed by: STUDENT IN AN ORGANIZED HEALTH CARE EDUCATION/TRAINING PROGRAM

## 2022-07-02 PROCEDURE — G0378 HOSPITAL OBSERVATION PER HR: HCPCS

## 2022-07-02 PROCEDURE — 85025 COMPLETE CBC W/AUTO DIFF WBC: CPT | Performed by: HOSPITALIST

## 2022-07-02 PROCEDURE — 99204 PR OFFICE/OUTPT VISIT, NEW, LEVL IV, 45-59 MIN: ICD-10-PCS | Mod: ,,, | Performed by: STUDENT IN AN ORGANIZED HEALTH CARE EDUCATION/TRAINING PROGRAM

## 2022-07-02 PROCEDURE — 80048 BASIC METABOLIC PNL TOTAL CA: CPT | Performed by: HOSPITALIST

## 2022-07-02 RX ORDER — ASPIRIN 81 MG/1
81 TABLET ORAL DAILY
Refills: 0
Start: 2022-07-02 | End: 2023-07-02

## 2022-07-02 RX ORDER — FUROSEMIDE 40 MG/1
40 TABLET ORAL DAILY
Status: DISCONTINUED | OUTPATIENT
Start: 2022-07-02 | End: 2022-07-02 | Stop reason: HOSPADM

## 2022-07-02 RX ORDER — METOPROLOL TARTRATE 50 MG/1
50 TABLET ORAL 2 TIMES DAILY
Qty: 60 TABLET | Refills: 0 | Status: SHIPPED | OUTPATIENT
Start: 2022-07-02 | End: 2022-08-01

## 2022-07-02 RX ORDER — LOSARTAN POTASSIUM 50 MG/1
50 TABLET ORAL DAILY
Qty: 30 TABLET | Refills: 0 | Status: SHIPPED | OUTPATIENT
Start: 2022-07-03 | End: 2022-08-02

## 2022-07-02 RX ORDER — FUROSEMIDE 40 MG/1
40 TABLET ORAL DAILY
Qty: 30 TABLET | Refills: 0 | Status: SHIPPED | OUTPATIENT
Start: 2022-07-03 | End: 2022-08-02

## 2022-07-02 RX ORDER — ATORVASTATIN CALCIUM 40 MG/1
40 TABLET, FILM COATED ORAL DAILY
Qty: 30 TABLET | Refills: 0 | Status: SHIPPED | OUTPATIENT
Start: 2022-07-02 | End: 2022-08-01

## 2022-07-02 RX ORDER — LOSARTAN POTASSIUM 25 MG/1
50 TABLET ORAL DAILY
Status: DISCONTINUED | OUTPATIENT
Start: 2022-07-02 | End: 2022-07-02 | Stop reason: HOSPADM

## 2022-07-02 RX ADMIN — ATORVASTATIN CALCIUM 40 MG: 40 TABLET, FILM COATED ORAL at 08:07

## 2022-07-02 RX ADMIN — CLOPIDOGREL 75 MG: 75 TABLET, FILM COATED ORAL at 08:07

## 2022-07-02 RX ADMIN — PANTOPRAZOLE SODIUM 40 MG: 40 TABLET, DELAYED RELEASE ORAL at 08:07

## 2022-07-02 RX ADMIN — LOSARTAN POTASSIUM 50 MG: 25 TABLET, FILM COATED ORAL at 06:07

## 2022-07-02 RX ADMIN — FUROSEMIDE 40 MG: 40 TABLET ORAL at 07:07

## 2022-07-02 RX ADMIN — METOPROLOL TARTRATE 50 MG: 50 TABLET, FILM COATED ORAL at 08:07

## 2022-07-02 RX ADMIN — ASPIRIN 81 MG: 81 TABLET, COATED ORAL at 08:07

## 2022-07-02 NOTE — SUBJECTIVE & OBJECTIVE
Past Medical History:   Diagnosis Date    MS (multiple sclerosis)        No past surgical history on file.    Review of patient's allergies indicates:  No Known Allergies  Family History    None       Tobacco Use    Smoking status: Not on file    Smokeless tobacco: Not on file   Substance and Sexual Activity    Alcohol use: Not on file    Drug use: Not on file    Sexual activity: Not on file     Review of Systems   Constitutional:  Negative for appetite change, diaphoresis and fatigue.   Respiratory:  Negative for shortness of breath, wheezing and stridor.    Cardiovascular:  Positive for chest pain.   Gastrointestinal:  Negative for abdominal pain, constipation, nausea and vomiting.   Musculoskeletal:  Negative for arthralgias and joint swelling.   Skin:  Negative for color change and pallor.   Allergic/Immunologic: Negative for environmental allergies and immunocompromised state.   Neurological:  Negative for speech difficulty.   Objective:     Vital Signs (Most Recent):  Temp: 98 °F (36.7 °C) (07/02/22 0749)  Pulse: 66 (07/02/22 0749)  Resp: 18 (07/02/22 0749)  BP: (!) 140/78 (07/02/22 0749)  SpO2: 98 % (07/02/22 0749)   Vital Signs (24h Range):  Temp:  [97.5 °F (36.4 °C)-98.4 °F (36.9 °C)] 98 °F (36.7 °C)  Pulse:  [60-83] 66  Resp:  [12-19] 18  SpO2:  [94 %-99 %] 98 %  BP: (115-182)/(59-87) 140/78     Weight: 112 kg (247 lb) (07/01/22 0608)  Body mass index is 38.69 kg/m².      Intake/Output Summary (Last 24 hours) at 7/2/2022 1039  Last data filed at 7/1/2022 1718  Gross per 24 hour   Intake 120 ml   Output --   Net 120 ml       Lines/Drains/Airways       None                   Physical Exam  Vitals reviewed.   Constitutional:       Appearance: Normal appearance. She is obese.   HENT:      Head: Normocephalic.      Nose: Nose normal.      Mouth/Throat:      Mouth: Mucous membranes are moist.   Eyes:      Extraocular Movements: Extraocular movements intact.      Conjunctiva/sclera: Conjunctivae normal.       Pupils: Pupils are equal, round, and reactive to light.   Cardiovascular:      Rate and Rhythm: Normal rate and regular rhythm.      Heart sounds: Normal heart sounds.   Pulmonary:      Effort: Pulmonary effort is normal.      Breath sounds: Normal breath sounds.   Abdominal:      General: Abdomen is flat. Bowel sounds are normal.      Palpations: Abdomen is soft.   Musculoskeletal:         General: Normal range of motion.      Cervical back: Normal range of motion.   Skin:     General: Skin is warm.   Neurological:      General: No focal deficit present.      Mental Status: She is alert. Mental status is at baseline.   Psychiatric:         Mood and Affect: Mood normal.       Significant Labs:  All pertinent lab results from the last 24 hours have been reviewed.    Significant Imaging:  Imaging results within the past 24 hours have been reviewed.

## 2022-07-02 NOTE — PLAN OF CARE
[facility]  Discharge Assessment    Primary Care Provider: Purnima Johns MD     Discharge Assessment (most recent)     BRIEF DISCHARGE ASSESSMENT - 07/02/22 1030        Discharge Planning    Assessment Type Discharge Planning Brief Assessment     Resource/Environmental Concerns none     Support Systems Family members     Equipment Currently Used at Home none     Current Living Arrangements home/apartment/condo     Patient/Family Anticipates Transition to home     Patient/Family Anticipated Services at Transition none     DME Needed Upon Discharge  none     Discharge Plan A Home

## 2022-07-02 NOTE — ASSESSMENT & PLAN NOTE
In brief, the patient is a 71yo woman with symptomatic GERD.    The patient notes that her GERD occurs with spicy foods, but resolves with OTC therapy.  She is not taking any consistent acid suppression therapy.  I would recommend she begin taking omeprazole 20mg twice daily.  She can also be referred to the GI clinic at discharge to reassess her response to therapy. I will message our scheduling hub.

## 2022-07-02 NOTE — CONSULTS
[facility] Surg  Gastroenterology  Consult Note    Patient Name: Sharla Leong  MRN: [id number]  Admission Date: 7/1/2022  Hospital Length of Stay: 1 days  Code Status: No Order   Attending Provider: Alexia Ceballos MD   Consulting Provider: Arnaldo Cruz MD  Primary Care Physician: Purnima Johns MD  Principal Problem:Elevated troponin    Inpatient consult to Gastroenterology  Consult performed by: Arnaldo Cruz MD  Consult ordered by: Alexia Ceballos MD  Reason for consult: GERD        Subjective:     HPI:  The patient is a 73yo woman who presented with lightheadedness.  GI is consulted for nausea and vomiting.      Admit HPI states patient denies any nausea or vomiting.  When speaking to the patient, she informs me that she has no nausea or vomiting, but indigestion instead.  Specifically, her symptoms occur with spicy foods only and resolve with acid suppression.      She is without any complaints and informs me she is going home today.    Currently being evaluated by cariology for atypical chest pain and troponin of 0.1.  EKG without any acute changes.  Will likely have outpatient stress.      No EGD for review.    Daughter is at bedside and corroborates history.      Past Medical History:   Diagnosis Date    MS (multiple sclerosis)        No past surgical history on file.    Review of patient's allergies indicates:  No Known Allergies  Family History    None       Tobacco Use    Smoking status: Not on file    Smokeless tobacco: Not on file   Substance and Sexual Activity    Alcohol use: Not on file    Drug use: Not on file    Sexual activity: Not on file     Review of Systems   Constitutional:  Negative for appetite change, diaphoresis and fatigue.   Respiratory:  Negative for shortness of breath, wheezing and stridor.    Cardiovascular:  Positive for chest pain.   Gastrointestinal:  Negative for abdominal pain, constipation, nausea and vomiting.   Musculoskeletal:   Negative for arthralgias and joint swelling.   Skin:  Negative for color change and pallor.   Allergic/Immunologic: Negative for environmental allergies and immunocompromised state.   Neurological:  Negative for speech difficulty.   Objective:     Vital Signs (Most Recent):  Temp: 98 °F (36.7 °C) (07/02/22 0749)  Pulse: 66 (07/02/22 0749)  Resp: 18 (07/02/22 0749)  BP: (!) 140/78 (07/02/22 0749)  SpO2: 98 % (07/02/22 0749)   Vital Signs (24h Range):  Temp:  [97.5 °F (36.4 °C)-98.4 °F (36.9 °C)] 98 °F (36.7 °C)  Pulse:  [60-83] 66  Resp:  [12-19] 18  SpO2:  [94 %-99 %] 98 %  BP: (115-182)/(59-87) 140/78     Weight: 112 kg (247 lb) (07/01/22 0608)  Body mass index is 38.69 kg/m².      Intake/Output Summary (Last 24 hours) at 7/2/2022 1039  Last data filed at 7/1/2022 1718  Gross per 24 hour   Intake 120 ml   Output --   Net 120 ml       Lines/Drains/Airways       None                   Physical Exam  Vitals reviewed.   Constitutional:       Appearance: Normal appearance. She is obese.   HENT:      Head: Normocephalic.      Nose: Nose normal.      Mouth/Throat:      Mouth: Mucous membranes are moist.   Eyes:      Extraocular Movements: Extraocular movements intact.      Conjunctiva/sclera: Conjunctivae normal.      Pupils: Pupils are equal, round, and reactive to light.   Cardiovascular:      Rate and Rhythm: Normal rate and regular rhythm.      Heart sounds: Normal heart sounds.   Pulmonary:      Effort: Pulmonary effort is normal.      Breath sounds: Normal breath sounds.   Abdominal:      General: Abdomen is flat. Bowel sounds are normal.      Palpations: Abdomen is soft.   Musculoskeletal:         General: Normal range of motion.      Cervical back: Normal range of motion.   Skin:     General: Skin is warm.   Neurological:      General: No focal deficit present.      Mental Status: She is alert. Mental status is at baseline.   Psychiatric:         Mood and Affect: Mood normal.       Significant Labs:  All pertinent  lab results from the last 24 hours have been reviewed.    Significant Imaging:  Imaging results within the past 24 hours have been reviewed.    Assessment/Plan:     GERD (gastroesophageal reflux disease)  In brief, the patient is a 71yo woman with symptomatic GERD.    The patient notes that her GERD occurs with spicy foods, but resolves with OTC therapy.  She is not taking any consistent acid suppression therapy.  I would recommend she begin taking omeprazole 20mg twice daily.  She can also be referred to the GI clinic at discharge to reassess her response to therapy. I will message our scheduling hub.          Thank you for your consult. I will sign off. Please contact us if you have any additional questions.    Arnaldo Cruz MD  Gastroenterology  [facility] Surg

## 2022-07-02 NOTE — DISCHARGE SUMMARY
"[facility] Medicine  Discharge Summary      Patient Name: Sharla Leong  MRN: [id number]  Patient Class: OP- Observation  Admission Date: 7/1/2022  Hospital Length of Stay: 1 days  Discharge Date and Time:  07/02/2022 10:21 AM  Attending Physician: Alexia Ceballos MD   Discharging Provider: Alexia Ceballos MD  Primary Care Provider: Purnima Johns MD      HPI:   72 y.o. female with no known medical problems,does not see PCP, presents to ED c/o acute lightheadedness that she noticed this morning just prior to arrival when she got out of bed and began walking to the bathroom. She denies syncope, difficulty walking, focal weakness, NV, CP or SOB.  She mentions acute on chronic "indigestion" which she describes as episodes of frequent belching.  She mentions a stated history of GERD for which, she only takes Gas X as needed.patient was feeling lightheadedness in ER,she has slight elevated troponin 0.1,but no sign of STEMI on EKG,patient is transferred from [facility] for further work in,currently patient denies chest pain.of note patient has elevated blood pressure with systolic over 190 and she is not on any medication at home.            * No surgery found *      Hospital Course:   72 y.o. female with no known medical problems,does not see PCP, presents to ED c/o acute lightheadedness that she noticed this morning just prior to arrival when she got out of bed and began walking to the bathroom. She denies syncope, difficulty walking, focal weakness, NV, CP or SOB.  She mentions acute on chronic "indigestion" which she describes as episodes of frequent belching.  She mentions a stated history of GERD for which, she only takes Gas X as needed.patient was feeling lightheadedness in ER,she has slight elevated troponin 0.1,but no sign of STEMI on EKG,patient is transferred from [facility] for further work in, patient denies chest pain.of note patient has elevated blood pressure with systolic " over 190 and she is not on any medication at home.she was monitored and cardiac marks remains negative,cardiology was consulted,Echo show preserved EF with diastolic CHF,\  Indigestion is resolved,she tolerated diet.patient remains asymptomatic,patient was discharged home with CHF and  HTN medications and follow up with PCP and cardiology as out patient for NST.       Goals of Care Treatment Preferences:         Consults:   Consults (From admission, onward)        Status Ordering Provider     Inpatient consult to Social Work  Once        Provider:  (Not yet assigned)    Acknowledged CASSIE SMILEY     Inpatient consult to Gastroenterology  Once        Provider:  Arnaldo Cruz MD    Acknowledged CASSIE SMILEY     Inpatient consult to Cardiology  Once        Provider:  Mj Mcelroy MD    Acknowledged CASSIE SMILEY          No new Assessment & Plan notes have been filed under this hospital service since the last note was generated.  Service: Hospital Medicine    Final Active Diagnoses:    Diagnosis Date Noted POA    PRINCIPAL PROBLEM:  Elevated troponin [R77.8] 07/01/2022 Yes    Primary hypertension [I10] 07/01/2022 Yes    Class 2 obesity in adult [E66.9] 07/01/2022 Yes      Problems Resolved During this Admission:       Discharged Condition: stable    Disposition: Home or Self Care    Follow Up:   Follow-up Information     [facility] .    Specialty: Emergency Medicine  Why: As needed, If symptoms worsen  Contact information:  [address] [facility] [address] [phone]           Punrima Johns MD .    Specialty: Internal Medicine  Contact information:  [address]  [facility] [address] [phone]             Purnima Johns MD Follow up.    Specialty: Internal Medicine  Contact information:  [address]  [facility] [address] [phone]                       Patient Instructions:      Ambulatory  referral/consult to Family Practice   Standing Status: Future   Referral Priority: Urgent Referral Type: Consultation   Referral Reason: Specialty Services Required   Requested Specialty: Family Medicine   Number of Visits Requested: 1     Activity as tolerated       Significant Diagnostic Studies: Labs:   BMP:   Recent Labs   Lab 07/02/22  0513   *      K 4.1      CO2 27   BUN 11   CREATININE 0.9   CALCIUM 9.1   , CMP   Recent Labs   Lab 07/02/22  0513      K 4.1      CO2 27   *   BUN 11   CREATININE 0.9   CALCIUM 9.1   ANIONGAP 9   ESTGFRAFRICA >60   EGFRNONAA >60   , CBC   Recent Labs   Lab 07/02/22  0513   WBC 7.36   HGB 12.5   HCT 37.8      , Lipid Panel   Lab Results   Component Value Date    CHOL 212 (H) 07/01/2022    HDL 48 07/01/2022    LDLCALC 149.0 07/01/2022    TRIG 75 07/01/2022    CHOLHDL 22.6 07/01/2022    and Troponin   Recent Labs   Lab 07/01/22  1355 07/01/22  2143 07/02/22  0513   TROPONINI 0.014 0.007 0.017     Radiology: X-Ray: CXR: X-Ray Chest 1 View (CXR): No results found for this visit on 07/01/22. and X-Ray Chest PA and Lateral (CXR): No results found for this visit on 07/01/22.  Cardiac Graphics: Echocardiogram:   2D echo with color flow doppler: No results found for this or any previous visit. and Transthoracic echo (TTE) complete (Cupid Only):   Results for orders placed or performed during the hospital encounter of 07/01/22   Echo   Result Value Ref Range    BSA 2.3 m2    TDI SEPTAL 0.07 m/s    LV LATERAL E/E' RATIO 8.89 m/s    LV SEPTAL E/E' RATIO 11.43 m/s    LA WIDTH 4.76 cm    TDI LATERAL 0.09 m/s    PV PEAK VELOCITY 1.24 cm/s    LVIDd 4.29 3.5 - 6.0 cm    IVS 0.92 0.6 - 1.1 cm    Posterior Wall 0.92 0.6 - 1.1 cm    LVIDs 2.65 2.1 - 4.0 cm    FS 38 28 - 44 %    LA volume 82.80 cm3    Sinus 2.72 cm    STJ 2.17 cm    Ascending aorta 2.92 cm    LV mass 126.56 g    LA size 3.79 cm    RVDD 3.36 cm    TAPSE 2.69 cm    RV S' 13.51 cm/s    Left  Ventricle Relative Wall Thickness 0.43 cm    AV mean gradient 6 mmHg    AV valve area 2.21 cm2    AV Velocity Ratio 0.65     AV index (prosthetic) 0.70     MV valve area p 1/2 method 3.28 cm2    E/A ratio 0.85     Mean e' 0.08 m/s    E wave deceleration time 231.41 msec    IVRT 110.73 msec    Pulm vein S/D ratio 2.03     LVOT diameter 2.01 cm    LVOT area 3.2 cm2    LVOT peak reece 1.08 m/s    LVOT peak VTI 26.30 cm    Ao peak reece 1.66 m/s    Ao VTI 37.71 cm    LVOT stroke volume 83.41 cm3    AV peak gradient 11 mmHg    E/E' ratio 10.00 m/s    MV Peak E Reece 0.80 m/s    TR Max Reece 2.96 m/s    MV stenosis pressure 1/2 time 67.11 ms    MV Peak A Reece 0.94 m/s    PV Peak S Reece 0.73 m/s    PV Peak D Reece 0.36 m/s    LV Systolic Volume 25.91 mL    LV Systolic Volume Index 11.7 mL/m2    LV Diastolic Volume 82.48 mL    LV Diastolic Volume Index 37.32 mL/m2    LA Volume Index 37.5 mL/m2    LV Mass Index 57 g/m2    RA Major Axis 5.32 cm    Left Atrium Minor Axis 5.43 cm    Left Atrium Major Axis 5.37 cm    Triscuspid Valve Regurgitation Peak Gradient 35 mmHg    RA Width 3.92 cm    Right Atrial Pressure (from IVC) 3 mmHg    EF 65 %    TV rest pulmonary artery pressure 38 mmHg    Narrative    · The left ventricle is normal in size with normal systolic function.  · The estimated ejection fraction is 65%.  · Grade II left ventricular diastolic dysfunction.  · Normal right ventricular size with normal right ventricular systolic   function.  · Normal central venous pressure (3 mmHg).  · The estimated PA systolic pressure is 38 mmHg.          Pending Diagnostic Studies:     Procedure Component Value Units Date/Time    EKG 12-lead [[id number]]     Order Status: Sent Lab Status: No result          Medications:  Reconciled Home Medications:      Medication List      START taking these medications    aspirin 81 MG EC tablet  Commonly known as: ECOTRIN  Take 1 tablet (81 mg total) by mouth once daily.     atorvastatin 40 MG tablet  Commonly  known as: LIPITOR  Take 1 tablet (40 mg total) by mouth once daily.     furosemide 40 MG tablet  Commonly known as: LASIX  Take 1 tablet (40 mg total) by mouth once daily.  Start taking on: July 3, 2022     losartan 50 MG tablet  Commonly known as: COZAAR  Take 1 tablet (50 mg total) by mouth once daily.  Start taking on: July 3, 2022     metoprolol tartrate 50 MG tablet  Commonly known as: LOPRESSOR  Take 1 tablet (50 mg total) by mouth 2 (two) times daily.        CONTINUE taking these medications    dimethyl fumarate 240 mg Cpdr  Take 240 mg by mouth 2 (two) times daily.            Indwelling Lines/Drains at time of discharge:   Lines/Drains/Airways     None                 Time spent on the discharge of patient: less than 30  minutes         Alexia Ceballos MD  Department of Hospital Medicine  [facility] S[facility]

## 2022-07-02 NOTE — HOSPITAL COURSE
"72 y.o. female with no known medical problems,does not see PCP, presents to ED c/o acute lightheadedness that she noticed this morning just prior to arrival when she got out of bed and began walking to the bathroom. She denies syncope, difficulty walking, focal weakness, NV, CP or SOB.  She mentions acute on chronic "indigestion" which she describes as episodes of frequent belching.  She mentions a stated history of GERD for which, she only takes Gas X as needed.patient was feeling lightheadedness in ER,she has slight elevated troponin 0.1,but no sign of STEMI on EKG,patient is transferred from ER [facility] for further work in, patient denies chest pain.of note patient has elevated blood pressure with systolic over 190 and she is not on any medication at home.she was monitored and cardiac marks remains negative,cardiology was consulted,Echo show preserved EF with diastolic CHF,\  Indigestion is resolved,she tolerated diet.patient remains asymptomatic,patient was discharged home with CHF and  HTN medications and follow up with PCP and cardiology as out patient for NST.  "

## 2022-07-02 NOTE — CONSULTS
Follow-up Information     Purnima Johns MD Follow up.    Specialty: Internal Medicine  Why: Please call office on Tuesday to arrange a, HOSPITAL DISCHARGE FOLLOW UP VISIT, in 1-2 weeks  Contact information:  [address] [facility] [address]  [facility] [address]  [phone]             Mj Mcelroy MD Follow up on 7/26/2022.    Specialty: Cardiology  Why: appointment scheduled for 0945;, you have been placed on the waiting list, Office may call if sooner appt is available  Contact information:  [address]  [address]  [facility] [address]  [phone]             [facility] up.    Specialty: Gastroenterology  Why: Please call next week to ensure appointment has been made for follow up  Contact information:  [address] [facility] [address]  [facility] [address] [phone]  Additional information:  Please park in garage or surface lot and use Medical Office Bldg entrance. Check in at [address]

## 2022-07-02 NOTE — NURSING
Patient discharged per MD order.  IV removed.  Catheter tip intact.  No distress noted.  Discharge instructions explained.  AVS given to patient .  Patient verbalized understanding.  VSS.  Afebrile.  No complaints of pain, N/V, diarrhea or SOB.  Rx provided  .  Patient  Transport requested.

## 2022-07-02 NOTE — PLAN OF CARE
[place] - Med Surg  Discharge Final Note    Primary Care Provider: Purnima Johns MD    Expected Discharge Date: 7/2/2022     All Case Management (CM) needs have been addressed; Nurse Rosa informed that patient is cleared from CM standpoint      Final Discharge Note (most recent)     Final Note - 07/02/22 1059        Final Note    Assessment Type Final Discharge Note     Anticipated Discharge Disposition Home or Self Care     What phone number can be called within the next 1-3 days to see how you are doing after discharge? [phone]     Hospital Resources/Appts/Education Provided Provided patient/caregiver with written discharge plan information;Appointments scheduled and added to AVS        Post-Acute Status    Post-Acute Authorization Other     Other Status No Post-Acute Service Needs     Discharge Delays None known at this time                 Important Message from Medicare             Contact Info     Purnima Johns MD   Specialty: Internal Medicine   Relationship: PCP - General    [address]  [facility] [address]   Phone: [phone]       Next Steps: Follow up    Instructions: Please call office on Tuesday to arrange a, HOSPITAL DISCHARGE FOLLOW UP VISIT, in 1-2 weeks    Mj Mcelroy MD   Specialty: Cardiology    [address]  [address]  [facility] [place] [address]   Phone: [phone]       Next Steps: Follow up on 7/26/2022    Instructions: appointment scheduled for 0945;, you have been placed on the waiting list, Office may call if sooner appt is available    [facility]   Specialty: Gastroenterology    [address]   [place] [address]   Phone: [phone]       Next Steps: Follow up    Instructions: Please call next week to ensure appointment has been made for follow up

## 2022-07-02 NOTE — PLAN OF CARE
WRITTEN HEALTHCARE DISCHARGE INFORMATION      Things that YOU are responsible for to Manage Your Care At Home:  1. Getting your prescriptions filled.  2. Taking you medications as directed. DO NOT MISS ANY DOSES!  3. Going to your follow-up doctor appointments. This is important because it allows the doctor to monitor your progress and to determine if any changes need to be made to your treatment plan.     If you are unable to make your follow up appointments, please call the number listed and reschedule this appointment.      After discharge, if you need assistance, you can call [facility] On Call Nurse Care Line for 24/7 assistance at [phone]     If you are experience any signs or symptoms, Call your doctor or Call 911 and come to your nearest Emergency Room.     Thank you for choosing [facility] and allowing us to care for you.        You should receive a call from [facility] Discharge Department within 48-72 hours to help manage your care after discharge. Please try to make sure that you answer your phone for this important phone call.       Follow-up Information       Purnima Johns MD Follow up.    Specialty: Internal Medicine  Why: Please call office on Tuesday to arrange a, HOSPITAL DISCHARGE FOLLOW UP VISIT, in 1-2 weeks  Contact information:  [address]  [facility] [address]  [phone]               Mj Mcelroy MD Follow up on 7/26/2022.    Specialty: Cardiology  Why: appointment scheduled for 0945;, you have been placed on the waiting list, Office may call if sooner appt is available  Contact information:  [address]  [address]  [facility] [address]  [phone]               [facility] Follow up.    Specialty: Gastroenterology  Why: Please call next week to ensure appointment has been made for follow up  Contact information:  [address]  [facility] [address] [phone]  Additional information:  Please park in garage or surface lot  and use Medical Office Bldg entrance. Check in at [address]                         '

## 2022-07-02 NOTE — PLAN OF CARE
Pt was assisted to w/c without injury,NADN,no c/o pain,pt now leaving unit with family at her side,assisted by pt transport.

## 2024-05-14 NOTE — HPI
"72 y.o. female with no known medical problems,does not see PCP, presents to ED c/o acute lightheadedness that she noticed this morning just prior to arrival when she got out of bed and began walking to the bathroom. She denies syncope, difficulty walking, focal weakness, NV, CP or SOB.  She mentions acute on chronic "indigestion" which she describes as episodes of frequent belching.  She mentions a stated history of GERD for which, she only takes Gas X as needed.patient was feeling lightheadedness in ER,she has slight elevated troponin 0.1,but no sign of STEMI on EKG,patient is transferred from ER [facility] for further work in,currently patient denies chest pain.of note patient has elevated blood pressure with systolic over 190 and she is not on any medication at home.        "
"HPI: 72 y.o. female with no known medical problems,does not see PCP, presents to ED c/o acute lightheadedness that she noticed this morning just prior to arrival when she got out of bed and began walking to the bathroom. She denies syncope, difficulty walking, focal weakness, NV, CP or SOB.  She mentions acute on chronic "indigestion" which she describes as episodes of frequent belching.  She mentions a stated history of GERD for which, she only takes Gas X as needed.patient was feeling lightheadedness in ER,she has slight elevated troponin 0.1,but no sign of STEMI on EKG,patient is transferred from ER [facility] for further work in,currently patient denies chest pain.of note patient has elevated blood pressure with systolic over 190 and she is not on any medication at home.      Currently denies CP or SOB  Troponin 0.1 repeat pending  EKG NSR - ok    Echo 7/1/22  The left ventricle is normal in size with normal systolic function.  The estimated ejection fraction is 65%.  Grade II left ventricular diastolic dysfunction.  Normal right ventricular size with normal right ventricular systolic function.  Normal central venous pressure (3 mmHg).  The estimated PA systolic pressure is 38 mmHg.     "
The patient is a 71yo woman who presented with lightheadedness.  GI is consulted for nausea and vomiting.      Admit HPI states patient denies any nausea or vomiting.  When speaking to the patient, she informs me that she has no nausea or vomiting, but indigestion instead.  Specifically, her symptoms occur with spicy foods only and resolve with acid suppression.      She is without any complaints and informs me she is going home today.    Currently being evaluated by cariology for atypical chest pain and troponin of 0.1.  EKG without any acute changes.  Will likely have outpatient stress.      No EGD for review.    Daughter is at bedside and corroborates history.  
Detail Level: Simple